# Patient Record
Sex: MALE
[De-identification: names, ages, dates, MRNs, and addresses within clinical notes are randomized per-mention and may not be internally consistent; named-entity substitution may affect disease eponyms.]

---

## 2017-03-14 PROCEDURE — 86689 HTLV/HIV CONFIRMJ ANTIBODY: CPT | Performed by: OTHER

## 2017-05-22 PROBLEM — M51.369 LUMBAR DEGENERATIVE DISC DISEASE: Status: ACTIVE | Noted: 2017-05-22

## 2017-05-22 PROBLEM — M48.00 SPINAL STENOSIS: Status: ACTIVE | Noted: 2017-05-22

## 2017-05-22 PROBLEM — M43.16 SPONDYLOLISTHESIS OF LUMBAR REGION: Status: ACTIVE | Noted: 2017-05-22

## 2017-05-22 PROBLEM — M51.36 LUMBAR DEGENERATIVE DISC DISEASE: Status: ACTIVE | Noted: 2017-05-22

## 2017-07-18 ENCOUNTER — HOSPITAL (OUTPATIENT)
Dept: OTHER | Age: 62
End: 2017-07-18
Attending: ORTHOPAEDIC SURGERY

## 2017-07-18 LAB
GLUCOSE BLDC GLUCOMTR-MCNC: 234 MG/DL (ref 65–99)
GLUCOSE BLDC GLUCOMTR-MCNC: 353 MG/DL (ref 65–99)
GLUCOSE BLDC GLUCOMTR-MCNC: 78 MG/DL (ref 65–99)
GLUCOSE BLDC GLUCOMTR-MCNC: 97 MG/DL (ref 65–99)
GLUCOSE BLDC GLUCOMTR-MCNC: 99 MG/DL (ref 65–99)

## 2017-07-19 ENCOUNTER — CHARTING TRANS (OUTPATIENT)
Dept: OTHER | Age: 62
End: 2017-07-19

## 2017-07-19 LAB
ANALYZER ANC (IANC): ABNORMAL
ANION GAP SERPL CALC-SCNC: 11 MMOL/L (ref 10–20)
ANION GAP SERPL CALC-SCNC: 14 MMOL/L (ref 10–20)
BASOPHILS # BLD: 0 THOUSAND/MCL (ref 0–0.3)
BASOPHILS NFR BLD: 0 %
BUN SERPL-MCNC: 23 MG/DL (ref 6–20)
BUN SERPL-MCNC: 29 MG/DL (ref 6–20)
BUN/CREAT SERPL: 21 (ref 7–25)
BUN/CREAT SERPL: 24 (ref 7–25)
CALCIUM SERPL-MCNC: 7.7 MG/DL (ref 8.4–10.2)
CALCIUM SERPL-MCNC: 8.7 MG/DL (ref 8.4–10.2)
CHLORIDE: 103 MMOL/L (ref 98–107)
CHLORIDE: 103 MMOL/L (ref 98–107)
CO2 SERPL-SCNC: 29 MMOL/L (ref 21–32)
CO2 SERPL-SCNC: 30 MMOL/L (ref 21–32)
CREAT SERPL-MCNC: 1.09 MG/DL (ref 0.67–1.17)
CREAT SERPL-MCNC: 1.23 MG/DL (ref 0.67–1.17)
DIFFERENTIAL METHOD BLD: ABNORMAL
EOSINOPHIL # BLD: 0.3 THOUSAND/MCL (ref 0.1–0.5)
EOSINOPHIL NFR BLD: 2 %
ERYTHROCYTE [DISTWIDTH] IN BLOOD: 13 % (ref 11–15)
GLUCOSE BLDC GLUCOMTR-MCNC: 244 MG/DL (ref 65–99)
GLUCOSE BLDC GLUCOMTR-MCNC: 263 MG/DL (ref 65–99)
GLUCOSE BLDC GLUCOMTR-MCNC: 267 MG/DL (ref 65–99)
GLUCOSE BLDC GLUCOMTR-MCNC: 273 MG/DL (ref 65–99)
GLUCOSE BLDC GLUCOMTR-MCNC: 285 MG/DL (ref 65–99)
GLUCOSE SERPL-MCNC: 229 MG/DL (ref 65–99)
GLUCOSE SERPL-MCNC: 271 MG/DL (ref 65–99)
HEMATOCRIT: 45 % (ref 39–51)
HEMATOCRIT: 45.6 % (ref 39–51)
HGB BLD-MCNC: 15.5 GM/DL (ref 13–17)
HGB BLD-MCNC: 15.8 GM/DL (ref 13–17)
LACTATE BLDV-SCNC: 2 MMOL/L (ref 0–2)
LYMPHOCYTES # BLD: 3.2 THOUSAND/MCL (ref 1–4)
LYMPHOCYTES NFR BLD: 20 %
MCH RBC QN AUTO: 30.7 PG (ref 26–34)
MCHC RBC AUTO-ENTMCNC: 34.6 GM/DL (ref 32–36.5)
MCV RBC AUTO: 88.5 FL (ref 78–100)
MONOCYTES # BLD: 1.4 THOUSAND/MCL (ref 0.3–0.9)
MONOCYTES NFR BLD: 9 %
NEUTROPHILS # BLD: 11.1 THOUSAND/MCL (ref 1.8–7.7)
NEUTS SEG NFR BLD: 69 %
PATH REV BLD -IMP: ABNORMAL
PLAT MORPH BLD: NORMAL
PLATELET # BLD: 245 THOUSAND/MCL (ref 140–450)
POTASSIUM SERPL-SCNC: 4 MMOL/L (ref 3.4–5.1)
POTASSIUM SERPL-SCNC: 4.8 MMOL/L (ref 3.4–5.1)
RBC # BLD: 5.15 MILLION/MCL (ref 4.5–5.9)
RBC MORPH BLD: NORMAL
SODIUM SERPL-SCNC: 139 MMOL/L (ref 135–145)
SODIUM SERPL-SCNC: 142 MMOL/L (ref 135–145)
WBC # BLD: 16.1 THOUSAND/MCL (ref 4.2–11)
WBC MORPH BLD: NORMAL

## 2017-07-20 LAB
ANALYZER ANC (IANC): ABNORMAL
ANION GAP SERPL CALC-SCNC: 15 MMOL/L (ref 10–20)
BASE DEFICIT BLDA-SCNC: 1 MMOL/L (ref 0–2)
BASE DEFICIT BLDA-SCNC: NORMAL MMOL/L
BASE EXCESS BLDA CALC-SCNC: 3 MMOL/L (ref 0–3)
BASE EXCESS BLDA CALC-SCNC: ABNORMAL MMOL/L
BDY SITE: ABNORMAL
BDY SITE: NORMAL
BODY TEMPERATURE: 37 DEGREES
BODY TEMPERATURE: 37.2 DEGREES
BUN SERPL-MCNC: 25 MG/DL (ref 6–20)
BUN/CREAT SERPL: 31 (ref 7–25)
CALCIUM SERPL-MCNC: 7.2 MG/DL (ref 8.4–10.2)
CHLORIDE: 103 MMOL/L (ref 98–107)
CO2 SERPL-SCNC: 25 MMOL/L (ref 21–32)
CONDITION: ABNORMAL
CONDITION: ABNORMAL
CONDITION: NORMAL
CONDITION: NORMAL
CREAT SERPL-MCNC: 0.8 MG/DL (ref 0.67–1.17)
ERYTHROCYTE [DISTWIDTH] IN BLOOD: 13.1 % (ref 11–15)
GLUCOSE BLDC GLUCOMTR-MCNC: 176 MG/DL (ref 65–99)
GLUCOSE BLDC GLUCOMTR-MCNC: 211 MG/DL (ref 65–99)
GLUCOSE BLDC GLUCOMTR-MCNC: 213 MG/DL (ref 65–99)
GLUCOSE BLDC GLUCOMTR-MCNC: 242 MG/DL (ref 65–99)
GLUCOSE SERPL-MCNC: 224 MG/DL (ref 65–99)
HCO3 BLDA-SCNC: 25 MMOL/L (ref 22–28)
HCO3 BLDA-SCNC: 28 MMOL/L (ref 22–28)
HEMATOCRIT: 43.8 % (ref 39–51)
HGB BLD-MCNC: 14.8 GM/DL (ref 13–17)
HOROWITZ INDEX BLD+IHG-RTO: 50 %
HOROWITZ INDEX BLD+IHG-RTO: NORMAL MM[HG]
LACTATE BLDA-MCNC: 1.3 MMOL/L
LACTATE BLDA-MCNC: 1.4 MMOL/L
MCH RBC QN AUTO: 29.2 PG (ref 26–34)
MCHC RBC AUTO-ENTMCNC: 33.8 GM/DL (ref 32–36.5)
MCV RBC AUTO: 86.6 FL (ref 78–100)
PCO2 BLDA: 45 MM HG (ref 35–48)
PCO2 BLDA: 45 MM HG (ref 35–48)
PH BLDA: 7.35 UNIT (ref 7.35–7.45)
PH BLDA: 7.4 UNIT (ref 7.35–7.45)
PLATELET # BLD: 192 THOUSAND/MCL (ref 140–450)
PO2 BLDA: 75 MM HG (ref 83–108)
PO2 BLDA: 87 MM HG (ref 83–108)
POTASSIUM SERPL-SCNC: 4.6 MMOL/L (ref 3.4–5.1)
RBC # BLD: 5.06 MILLION/MCL (ref 4.5–5.9)
SAO2 % BLDA: 94 % (ref 95–99)
SAO2 % BLDA: 97 % (ref 95–99)
SODIUM SERPL-SCNC: 138 MMOL/L (ref 135–145)
WBC # BLD: 11.9 THOUSAND/MCL (ref 4.2–11)

## 2017-07-21 LAB
ANALYZER ANC (IANC): ABNORMAL
ANION GAP SERPL CALC-SCNC: 12 MMOL/L (ref 10–20)
BUN SERPL-MCNC: 22 MG/DL (ref 6–20)
BUN/CREAT SERPL: 29 (ref 7–25)
CALCIUM SERPL-MCNC: 7.7 MG/DL (ref 8.4–10.2)
CHLORIDE: 107 MMOL/L (ref 98–107)
CO2 SERPL-SCNC: 29 MMOL/L (ref 21–32)
CREAT SERPL-MCNC: 0.77 MG/DL (ref 0.67–1.17)
ERYTHROCYTE [DISTWIDTH] IN BLOOD: 13.6 % (ref 11–15)
GLUCOSE BLDC GLUCOMTR-MCNC: 124 MG/DL (ref 65–99)
GLUCOSE BLDC GLUCOMTR-MCNC: 141 MG/DL (ref 65–99)
GLUCOSE BLDC GLUCOMTR-MCNC: 148 MG/DL (ref 65–99)
GLUCOSE BLDC GLUCOMTR-MCNC: 190 MG/DL (ref 65–99)
GLUCOSE BLDC GLUCOMTR-MCNC: 95 MG/DL (ref 65–99)
GLUCOSE SERPL-MCNC: 158 MG/DL (ref 65–99)
HEMATOCRIT: 43.5 % (ref 39–51)
HGB BLD-MCNC: 14.7 GM/DL (ref 13–17)
MAGNESIUM SERPL-MCNC: 1.9 MG/DL (ref 1.7–2.4)
MCH RBC QN AUTO: 29.8 PG (ref 26–34)
MCHC RBC AUTO-ENTMCNC: 33.8 GM/DL (ref 32–36.5)
MCV RBC AUTO: 88.1 FL (ref 78–100)
PHOSPHATE SERPL-MCNC: 2 MG/DL (ref 2.4–4.7)
PLATELET # BLD: 194 THOUSAND/MCL (ref 140–450)
POTASSIUM SERPL-SCNC: 3.8 MMOL/L (ref 3.4–5.1)
RBC # BLD: 4.94 MILLION/MCL (ref 4.5–5.9)
SODIUM SERPL-SCNC: 144 MMOL/L (ref 135–145)
WBC # BLD: 12.8 THOUSAND/MCL (ref 4.2–11)

## 2017-07-22 ENCOUNTER — DIAGNOSTIC TRANS (OUTPATIENT)
Dept: OTHER | Age: 62
End: 2017-07-22

## 2017-07-22 LAB
ANALYZER ANC (IANC): ABNORMAL
ANION GAP SERPL CALC-SCNC: 11 MMOL/L (ref 10–20)
ANION GAP SERPL CALC-SCNC: 7 MMOL/L (ref 10–20)
BUN SERPL-MCNC: 16 MG/DL (ref 6–20)
BUN SERPL-MCNC: 17 MG/DL (ref 6–20)
BUN/CREAT SERPL: 21 (ref 7–25)
BUN/CREAT SERPL: 24 (ref 7–25)
CALCIUM SERPL-MCNC: 7.9 MG/DL (ref 8.4–10.2)
CALCIUM SERPL-MCNC: 8 MG/DL (ref 8.4–10.2)
CHLORIDE: 105 MMOL/L (ref 98–107)
CHLORIDE: 106 MMOL/L (ref 98–107)
CO2 SERPL-SCNC: 31 MMOL/L (ref 21–32)
CO2 SERPL-SCNC: 34 MMOL/L (ref 21–32)
CREAT SERPL-MCNC: 0.7 MG/DL (ref 0.67–1.17)
CREAT SERPL-MCNC: 0.77 MG/DL (ref 0.67–1.17)
ERYTHROCYTE [DISTWIDTH] IN BLOOD: 13 % (ref 11–15)
GLUCOSE BLDC GLUCOMTR-MCNC: 141 MG/DL (ref 65–99)
GLUCOSE BLDC GLUCOMTR-MCNC: 166 MG/DL (ref 65–99)
GLUCOSE BLDC GLUCOMTR-MCNC: 69 MG/DL (ref 65–99)
GLUCOSE BLDC GLUCOMTR-MCNC: 76 MG/DL (ref 65–99)
GLUCOSE SERPL-MCNC: 156 MG/DL (ref 65–99)
GLUCOSE SERPL-MCNC: 76 MG/DL (ref 65–99)
GLYCOHEMOGLOBIN: 9 % (ref 4.5–5.6)
HEMATOCRIT: 41.8 % (ref 39–51)
HGB BLD-MCNC: 14.1 GM/DL (ref 13–17)
MAGNESIUM SERPL-MCNC: 2 MG/DL (ref 1.7–2.4)
MCH RBC QN AUTO: 29.4 PG (ref 26–34)
MCHC RBC AUTO-ENTMCNC: 33.7 GM/DL (ref 32–36.5)
MCV RBC AUTO: 87.3 FL (ref 78–100)
PHOSPHATE SERPL-MCNC: 1.7 MG/DL (ref 2.4–4.7)
PHOSPHATE SERPL-MCNC: 3 MG/DL (ref 2.4–4.7)
PLATELET # BLD: 172 THOUSAND/MCL (ref 140–450)
POTASSIUM SERPL-SCNC: 3.6 MMOL/L (ref 3.4–5.1)
POTASSIUM SERPL-SCNC: 3.8 MMOL/L (ref 3.4–5.1)
RBC # BLD: 4.79 MILLION/MCL (ref 4.5–5.9)
SODIUM SERPL-SCNC: 143 MMOL/L (ref 135–145)
SODIUM SERPL-SCNC: 143 MMOL/L (ref 135–145)
TSH SERPL-ACNC: 1.78 MCUNIT/ML (ref 0.35–5)
WBC # BLD: 13.1 THOUSAND/MCL (ref 4.2–11)

## 2017-07-23 ENCOUNTER — CHARTING TRANS (OUTPATIENT)
Dept: OTHER | Age: 62
End: 2017-07-23

## 2017-07-23 LAB
ANION GAP SERPL CALC-SCNC: 13 MMOL/L (ref 10–20)
BUN SERPL-MCNC: 18 MG/DL (ref 6–20)
BUN/CREAT SERPL: 28 (ref 7–25)
CALCIUM SERPL-MCNC: 7.9 MG/DL (ref 8.4–10.2)
CHLORIDE: 104 MMOL/L (ref 98–107)
CO2 SERPL-SCNC: 29 MMOL/L (ref 21–32)
CREAT SERPL-MCNC: 0.65 MG/DL (ref 0.67–1.17)
GLUCOSE BLDC GLUCOMTR-MCNC: 181 MG/DL (ref 65–99)
GLUCOSE BLDC GLUCOMTR-MCNC: 194 MG/DL (ref 65–99)
GLUCOSE BLDC GLUCOMTR-MCNC: 212 MG/DL (ref 65–99)
GLUCOSE BLDC GLUCOMTR-MCNC: 220 MG/DL (ref 65–99)
GLUCOSE SERPL-MCNC: 193 MG/DL (ref 65–99)
MAGNESIUM SERPL-MCNC: 2 MG/DL (ref 1.7–2.4)
PHOSPHATE SERPL-MCNC: 2.5 MG/DL (ref 2.4–4.7)
POTASSIUM SERPL-SCNC: 3.7 MMOL/L (ref 3.4–5.1)
SODIUM SERPL-SCNC: 142 MMOL/L (ref 135–145)

## 2017-07-24 LAB
ANALYZER ANC (IANC): ABNORMAL
ANION GAP SERPL CALC-SCNC: 11 MMOL/L (ref 10–20)
BUN SERPL-MCNC: 20 MG/DL (ref 6–20)
BUN/CREAT SERPL: 30 (ref 7–25)
CALCIUM SERPL-MCNC: 7.9 MG/DL (ref 8.4–10.2)
CHLORIDE: 105 MMOL/L (ref 98–107)
CO2 SERPL-SCNC: 30 MMOL/L (ref 21–32)
CREAT SERPL-MCNC: 0.67 MG/DL (ref 0.67–1.17)
ERYTHROCYTE [DISTWIDTH] IN BLOOD: 13.4 % (ref 11–15)
GLUCOSE BLDC GLUCOMTR-MCNC: 216 MG/DL (ref 65–99)
GLUCOSE BLDC GLUCOMTR-MCNC: 221 MG/DL (ref 65–99)
GLUCOSE BLDC GLUCOMTR-MCNC: 233 MG/DL (ref 65–99)
GLUCOSE BLDC GLUCOMTR-MCNC: 246 MG/DL (ref 65–99)
GLUCOSE BLDC GLUCOMTR-MCNC: 462 MG/DL (ref 65–99)
GLUCOSE SERPL-MCNC: 201 MG/DL (ref 65–99)
HEMATOCRIT: 44.7 % (ref 39–51)
HGB BLD-MCNC: 15.1 GM/DL (ref 13–17)
MAGNESIUM SERPL-MCNC: 2 MG/DL (ref 1.7–2.4)
MCH RBC QN AUTO: 30 PG (ref 26–34)
MCHC RBC AUTO-ENTMCNC: 33.8 GM/DL (ref 32–36.5)
MCV RBC AUTO: 88.9 FL (ref 78–100)
PHOSPHATE SERPL-MCNC: 2.6 MG/DL (ref 2.4–4.7)
PLATELET # BLD: 247 THOUSAND/MCL (ref 140–450)
POTASSIUM SERPL-SCNC: 3.6 MMOL/L (ref 3.4–5.1)
RBC # BLD: 5.03 MILLION/MCL (ref 4.5–5.9)
SODIUM SERPL-SCNC: 142 MMOL/L (ref 135–145)
WBC # BLD: 16.6 THOUSAND/MCL (ref 4.2–11)

## 2017-07-25 LAB
GLUCOSE BLDC GLUCOMTR-MCNC: 247 MG/DL (ref 65–99)
GLUCOSE BLDC GLUCOMTR-MCNC: 256 MG/DL (ref 65–99)
GLUCOSE BLDC GLUCOMTR-MCNC: 291 MG/DL (ref 65–99)
GLUCOSE BLDC GLUCOMTR-MCNC: 314 MG/DL (ref 65–99)
POTASSIUM SERPL-SCNC: 3.9 MMOL/L (ref 3.4–5.1)

## 2017-07-26 LAB
GLUCOSE BLDC GLUCOMTR-MCNC: 181 MG/DL (ref 65–99)
GLUCOSE BLDC GLUCOMTR-MCNC: 184 MG/DL (ref 65–99)
GLUCOSE BLDC GLUCOMTR-MCNC: 256 MG/DL (ref 65–99)

## 2017-08-01 PROBLEM — Z98.890 S/P EXPLORATORY LAPAROTOMY: Status: ACTIVE | Noted: 2017-08-01

## 2017-08-04 PROBLEM — Z98.1 S/P LUMBAR FUSION: Status: ACTIVE | Noted: 2017-08-04

## 2017-08-22 PROBLEM — M48.061 SPINAL STENOSIS OF LUMBAR REGION: Status: ACTIVE | Noted: 2017-08-22

## 2017-10-04 PROBLEM — M54.5 ACUTE MIDLINE LOW BACK PAIN, WITH SCIATICA PRESENCE UNSPECIFIED: Status: ACTIVE | Noted: 2017-10-04

## 2018-07-02 PROBLEM — M43.16 SPONDYLOLISTHESIS OF LUMBAR REGION: Status: RESOLVED | Noted: 2017-05-22 | Resolved: 2018-07-02

## 2018-08-30 PROBLEM — E11.9 DIABETES MELLITUS WITH COINCIDENT HYPERTENSION (HCC): Status: ACTIVE | Noted: 2018-08-30

## 2018-08-30 PROBLEM — E11.69 TYPE 2 DIABETES MELLITUS WITH HYPERLIPIDEMIA (HCC): Status: ACTIVE | Noted: 2018-08-30

## 2018-08-30 PROBLEM — Z79.4 UNCONTROLLED TYPE 2 DIABETES MELLITUS WITH HYPERGLYCEMIA, WITH LONG-TERM CURRENT USE OF INSULIN (HCC): Status: ACTIVE | Noted: 2018-08-30

## 2018-08-30 PROBLEM — E78.5 TYPE 2 DIABETES MELLITUS WITH HYPERLIPIDEMIA (HCC): Status: ACTIVE | Noted: 2018-08-30

## 2018-08-30 PROBLEM — I10 DIABETES MELLITUS WITH COINCIDENT HYPERTENSION (HCC): Status: ACTIVE | Noted: 2018-08-30

## 2018-08-30 PROBLEM — E11.65 UNCONTROLLED TYPE 2 DIABETES MELLITUS WITH HYPERGLYCEMIA, WITH LONG-TERM CURRENT USE OF INSULIN (HCC): Status: ACTIVE | Noted: 2018-08-30

## 2019-09-30 ENCOUNTER — APPOINTMENT (OUTPATIENT)
Dept: GENERAL RADIOLOGY | Age: 64
End: 2019-09-30
Attending: EMERGENCY MEDICINE
Payer: COMMERCIAL

## 2019-09-30 ENCOUNTER — HOSPITAL ENCOUNTER (OUTPATIENT)
Age: 64
Discharge: HOME | End: 2019-09-30
Attending: EMERGENCY MEDICINE
Payer: COMMERCIAL

## 2019-09-30 VITALS
WEIGHT: 183 LBS | HEART RATE: 99 BPM | TEMPERATURE: 98 F | BODY MASS INDEX: 28 KG/M2 | RESPIRATION RATE: 18 BRPM | SYSTOLIC BLOOD PRESSURE: 131 MMHG | OXYGEN SATURATION: 100 % | DIASTOLIC BLOOD PRESSURE: 82 MMHG

## 2019-09-30 DIAGNOSIS — J40 BRONCHITIS: Primary | ICD-10-CM

## 2019-09-30 PROCEDURE — 99204 OFFICE O/P NEW MOD 45 MIN: CPT

## 2019-09-30 PROCEDURE — 71046 X-RAY EXAM CHEST 2 VIEWS: CPT | Performed by: EMERGENCY MEDICINE

## 2019-09-30 PROCEDURE — 99203 OFFICE O/P NEW LOW 30 MIN: CPT

## 2019-09-30 RX ORDER — BENZONATATE 100 MG/1
100 CAPSULE ORAL 3 TIMES DAILY PRN
Qty: 30 CAPSULE | Refills: 0 | Status: SHIPPED | OUTPATIENT
Start: 2019-09-30 | End: 2019-10-30

## 2019-09-30 RX ORDER — ALBUTEROL SULFATE 90 UG/1
2 AEROSOL, METERED RESPIRATORY (INHALATION) EVERY 4 HOURS PRN
Qty: 1 INHALER | Refills: 0 | Status: SHIPPED | OUTPATIENT
Start: 2019-09-30 | End: 2019-10-30

## 2019-10-01 NOTE — ED PROVIDER NOTES
Patient Seen in: [facility] In [address] [place] [address]      History   Patient presents with:  Cough/URI  Sore Throat    Stated Complaint: sorethroat/cough    HPI    48-year-old male with 5 days of sore throat and cough. There has been no fever. oropharyngeal edema or tonsillar abscesses. Eyes: Pupils are equal, round, and reactive to light. Conjunctivae are normal.   Cardiovascular: Normal rate, regular rhythm and intact distal pulses. Pulmonary/Chest: Effort normal. No respiratory distress.

## 2025-02-19 ENCOUNTER — HOSPITAL ENCOUNTER (OUTPATIENT)
Age: 70
Discharge: HOME | End: 2025-02-19
Payer: MEDICARE

## 2025-02-19 VITALS
TEMPERATURE: 100 F | SYSTOLIC BLOOD PRESSURE: 124 MMHG | RESPIRATION RATE: 22 BRPM | DIASTOLIC BLOOD PRESSURE: 75 MMHG | HEART RATE: 98 BPM | OXYGEN SATURATION: 96 %

## 2025-02-19 DIAGNOSIS — J11.1 INFLUENZA: Primary | ICD-10-CM

## 2025-02-19 LAB
POCT INFLUENZA A: POSITIVE
POCT INFLUENZA B: NEGATIVE
SARS-COV-2 RNA RESP QL NAA+PROBE: NOT DETECTED

## 2025-02-19 PROCEDURE — 99203 OFFICE O/P NEW LOW 30 MIN: CPT

## 2025-02-19 PROCEDURE — 87502 INFLUENZA DNA AMP PROBE: CPT | Performed by: PHYSICIAN ASSISTANT

## 2025-02-19 RX ORDER — ALBUTEROL SULFATE 90 UG/1
2 INHALANT RESPIRATORY (INHALATION) EVERY 4 HOURS PRN
Qty: 1 EACH | Refills: 0 | Status: SHIPPED | OUTPATIENT
Start: 2025-02-19 | End: 2025-03-21

## 2025-02-19 RX ORDER — BALOXAVIR MARBOXIL 80 MG/1
1 TABLET, FILM COATED ORAL ONCE
Qty: 1 EACH | Refills: 0 | Status: SHIPPED | OUTPATIENT
Start: 2025-02-19 | End: 2025-02-19

## 2025-02-19 RX ORDER — OSELTAMIVIR PHOSPHATE 75 MG/1
75 CAPSULE ORAL 2 TIMES DAILY
Qty: 10 CAPSULE | Refills: 0 | Status: SHIPPED | OUTPATIENT
Start: 2025-02-19 | End: 2025-02-24

## 2025-02-20 NOTE — ED INITIAL ASSESSMENT (HPI)
Patient arrived ambulatory to room c/o symptoms that started 3 days ago. +productive cough. Patient states he feels that both of his ears are clogged. +nasal congestion. No fevers. No n/v/d. Easy non labored respirations.

## 2025-02-20 NOTE — ED PROVIDER NOTES
Patient Seen in: [facility]      History     Chief Complaint   Patient presents with    Cough     Stated Complaint: COUGH NAUSEA    Subjective:   HPI    69-year-old male with past medical history of hypertension, hyperlipidemia, diabetes presents to the immediate care for evaluation of URI symptoms which started 3 days ago.  Patient reporting nasal congestion, productive cough, fatigue and the feeling of clogged ears in both the ears.    Objective:     Past Medical History:    Diabetes (HCC)    Essential hypertension    Hyperlipidemia    Osteoarthritis    Other retinal detachments    PTSD (post-traumatic stress disorder)              Past Surgical History:   Procedure Laterality Date    Appendectomy      Back surgery      Other surgical history      eye surgeries for retinal detachments    Other surgical history      left ulnar transposition surgery    Other surgical history  07/19/2017    Exploratory Laparotomy by Dr. Hernandez    Tonsillectomy                  Social History     Socioeconomic History    Marital status:    Tobacco Use    Smoking status: Never    Smokeless tobacco: Never   Substance and Sexual Activity    Alcohol use: Yes     Comment: rare    Drug use: No     Social Drivers of Health      Received from [facility]              Review of Systems    Positive for stated complaint: COUGH NAUSEA  Other systems are as noted in HPI.  Constitutional and vital signs reviewed.      All other systems reviewed and negative except as noted above.    Physical Exam     ED Triage Vitals [02/19/25 1816]   /75   Pulse 98   Resp 22   Temp 100 °F (37.8 °C)   Temp src Oral   SpO2 96 %   O2 Device None (Room air)       Current Vitals:   Vital Signs  BP: 124/75  Pulse: 98  Resp: 22  Temp: 100 °F (37.8 °C)  Temp src: Oral    Oxygen Therapy  SpO2: 96 %  O2 Device: None (Room air)        Physical Exam  Vitals and nursing note reviewed.   Constitutional:       General: He is not in  acute distress.  HENT:      Head: Normocephalic and atraumatic.      Right Ear: External ear normal.      Left Ear: External ear normal.      Nose: Nose normal.      Mouth/Throat:      Mouth: Mucous membranes are moist.   Eyes:      Extraocular Movements: Extraocular movements intact.      Pupils: Pupils are equal, round, and reactive to light.   Cardiovascular:      Rate and Rhythm: Normal rate.   Pulmonary:      Effort: Pulmonary effort is normal.      Breath sounds: Wheezing (scattered) present.   Abdominal:      General: Abdomen is flat.   Musculoskeletal:         General: Normal range of motion.      Cervical back: Normal range of motion.   Skin:     General: Skin is warm.   Neurological:      General: No focal deficit present.      Mental Status: He is alert and oriented to person, place, and time.   Psychiatric:         Mood and Affect: Mood normal.         Behavior: Behavior normal.             ED Course     Labs Reviewed   POCT FLU TEST - Abnormal; Notable for the following components:       Result Value    POCT INFLUENZA A Positive (*)     All other components within normal limits    Narrative:     This assay is a rapid molecular in vitro test utilizing nucleic acid amplification of influenza A and B viral RNA.   RAPID SARS-COV-2 BY PCR - Normal          68 yo male here with c/o uri symptoms for the last 3 days.  Patient temperature 100 in the immediate care.  There is scattered expiratory wheezing.  No complaint of chest pain or shortness of breath and oxygen saturation 96% on room air    Ddx-influenza A, COVID, viral URI  Influenza A positive.  Patient does have significant comorbidities of hypertension, diabetes.  We did discuss using Xofluza and this will be Rx'd to his pharmacy on file.    I was unable to speak with the patient's pharmacy in regards to availability, cost of this medication (pharmacy on meal break).  As an alternative, a printed Tamiflu prescription was provided to the patient.   Patient and wife understand that he should only take either the Xofluza or Tamiflu and not both.    Continue supportive care, OTC meds and PCP follow-up.  ER return precautions advised           MDM              Medical Decision Making      Disposition and Plan     Clinical Impression:  1. Influenza         Disposition:  Discharge  2/19/2025  6:59 pm    Follow-up:  Doming[facility] Osborne  [address]  [facility] [id number] [phone]                Medications Prescribed:  Current Discharge Medication List        START taking these medications    Details   Baloxavir Marboxil,80 MG Dose, (XOFLUZA, 80 MG DOSE,) 1 x 80 MG Oral Tablet Therapy Pack Take 1 tablet by mouth one time for 1 dose.  Qty: 1 each, Refills: 0      oseltamivir 75 MG Oral Cap Take 1 capsule (75 mg total) by mouth 2 (two) times daily for 5 days.  Qty: 10 capsule, Refills: 0      albuterol 108 (90 Base) MCG/ACT Inhalation Aero Soln Inhale 2 puffs into the lungs every 4 (four) hours as needed for Wheezing.  Qty: 1 each, Refills: 0                 Supplementary Documentation: